# Patient Record
Sex: MALE | Race: WHITE | HISPANIC OR LATINO
[De-identification: names, ages, dates, MRNs, and addresses within clinical notes are randomized per-mention and may not be internally consistent; named-entity substitution may affect disease eponyms.]

---

## 2020-01-03 PROBLEM — Z00.00 ENCOUNTER FOR PREVENTIVE HEALTH EXAMINATION: Status: ACTIVE | Noted: 2020-01-03

## 2020-01-17 ENCOUNTER — APPOINTMENT (OUTPATIENT)
Dept: SURGERY | Facility: CLINIC | Age: 60
End: 2020-01-17

## 2020-02-11 ENCOUNTER — APPOINTMENT (OUTPATIENT)
Dept: SURGERY | Facility: CLINIC | Age: 60
End: 2020-02-11
Payer: COMMERCIAL

## 2020-02-11 VITALS
HEIGHT: 67 IN | BODY MASS INDEX: 28.25 KG/M2 | DIASTOLIC BLOOD PRESSURE: 87 MMHG | SYSTOLIC BLOOD PRESSURE: 149 MMHG | WEIGHT: 180 LBS | TEMPERATURE: 98.7 F | RESPIRATION RATE: 16 BRPM | OXYGEN SATURATION: 98 % | HEART RATE: 62 BPM

## 2020-02-11 DIAGNOSIS — Z78.9 OTHER SPECIFIED HEALTH STATUS: ICD-10-CM

## 2020-02-11 DIAGNOSIS — E78.00 PURE HYPERCHOLESTEROLEMIA, UNSPECIFIED: ICD-10-CM

## 2020-02-11 PROCEDURE — 46600 DIAGNOSTIC ANOSCOPY SPX: CPT

## 2020-02-11 PROCEDURE — 99213 OFFICE O/P EST LOW 20 MIN: CPT | Mod: 25

## 2020-02-11 RX ORDER — ATORVASTATIN CALCIUM 40 MG/1
40 TABLET, FILM COATED ORAL
Refills: 0 | Status: ACTIVE | COMMUNITY

## 2020-02-11 RX ORDER — CABERGOLINE 0.5 MG
0.5 TABLET ORAL
Refills: 0 | Status: ACTIVE | COMMUNITY

## 2020-02-11 NOTE — PHYSICAL EXAM
[Abdomen Tenderness] : ~T No ~M abdominal tenderness [Wheezing] : no wheezing was heard [Normal Rate and Rhythm] : normal rate and rhythm [No Rash or Lesion] : No rash or lesion [Alert] : alert [Calm] : calm [de-identified] : Perianal inspection reveals minimal external hemorrhoids. There is no fissure fistula or abscess. His rectal examination is normal. Anoscopy reveals friable and large circumferential internal hemorrhoids with evidence of recent bleeding. Distal rectal mucosa is otherwise normal. [de-identified] : nad [de-identified] : nl

## 2020-02-11 NOTE — ASSESSMENT
[FreeTextEntry1] : In summary the patient has persistent/intermittent rectal bleeding likely from his internal hemorrhoids. I recommended rubber band ligations. He chose to defer treatment for the time being and will followup if his bleeding worsens or persists. He will followup with GI for colorectal cancer screening

## 2020-02-11 NOTE — CONSULT LETTER
[Please see my note below.] : Please see my note below. [Consult Letter:] : I had the pleasure of evaluating your patient, [unfilled]. [Dear  ___] : Dear  [unfilled], [Sincerely,] : Sincerely, [Consult Closing:] : Thank you very much for allowing me to participate in the care of this patient.  If you have any questions, please do not hesitate to contact me. [DrGege  ___] : Dr. MEDINA [FreeTextEntry3] : Gurinder Santana M.D., F.A.C.S, F.A.S.C.R.S

## 2020-02-11 NOTE — HISTORY OF PRESENT ILLNESS
[FreeTextEntry1] : Adolfo is a 60 y/o male here for evaluation of rectal bleeding. Colonoscopy from 12/16/16 by Dr. Forrest Curtis demonstrated internal hemorrhoids. The patient complains of intermittent blood dripping into the bowl and on the tissue with bowel movements. He had rubber band ligation several years ago. He states that this only helped for short period of time and his blood recurred soon thereafter. He denies pain or prolapse of tissue.

## 2020-08-25 ENCOUNTER — APPOINTMENT (OUTPATIENT)
Dept: COLORECTAL SURGERY | Facility: CLINIC | Age: 60
End: 2020-08-25
Payer: COMMERCIAL

## 2020-08-25 VITALS
SYSTOLIC BLOOD PRESSURE: 174 MMHG | DIASTOLIC BLOOD PRESSURE: 91 MMHG | HEART RATE: 87 BPM | WEIGHT: 180 LBS | OXYGEN SATURATION: 99 % | TEMPERATURE: 99.4 F | RESPIRATION RATE: 16 BRPM | HEIGHT: 67 IN | BODY MASS INDEX: 28.25 KG/M2

## 2020-08-25 DIAGNOSIS — K64.4 RESIDUAL HEMORRHOIDAL SKIN TAGS: ICD-10-CM

## 2020-08-25 DIAGNOSIS — Z86.018 PERSONAL HISTORY OF OTHER BENIGN NEOPLASM: ICD-10-CM

## 2020-08-25 DIAGNOSIS — K62.5 HEMORRHAGE OF ANUS AND RECTUM: ICD-10-CM

## 2020-08-25 DIAGNOSIS — I10 ESSENTIAL (PRIMARY) HYPERTENSION: ICD-10-CM

## 2020-08-25 DIAGNOSIS — K64.8 OTHER HEMORRHOIDS: ICD-10-CM

## 2020-08-25 PROCEDURE — 46600 DIAGNOSTIC ANOSCOPY SPX: CPT

## 2020-08-25 PROCEDURE — 99214 OFFICE O/P EST MOD 30 MIN: CPT | Mod: 25

## 2020-08-25 RX ORDER — HYDROCORTISONE 25 MG/G
2.5 CREAM TOPICAL
Qty: 1 | Refills: 5 | Status: ACTIVE | COMMUNITY
Start: 2020-08-25 | End: 1900-01-01

## 2020-08-25 RX ORDER — AMLODIPINE BESYLATE 10 MG/1
10 TABLET ORAL
Refills: 0 | Status: ACTIVE | COMMUNITY

## 2020-08-25 NOTE — CONSULT LETTER
[Dear  ___] : Dear  [unfilled], [Consult Letter:] : I had the pleasure of evaluating your patient, [unfilled]. [Please see my note below.] : Please see my note below. [FreeTextEntry2] : Forrest Curtis [FreeTextEntry3] : Tony Duron MD FACS\par Chief Colon and Rectal Surgery\par [facility] [Sincerely,] : Sincerely,

## 2020-08-25 NOTE — PHYSICAL EXAM
[Normal Heart Sounds] : normal heart sounds [Normal Breath Sounds] : Normal breath sounds [Normal Rate and Rhythm] : normal rate and rhythm [Alert] : alert [Oriented to Person] : oriented to person [Oriented to Place] : oriented to place [Calm] : calm [Oriented to Time] : oriented to time [de-identified] : round, +BS [de-identified] : well appearing [de-identified] : NC/AT [de-identified] : intact [de-identified] : +ROM/YANNI

## 2020-08-25 NOTE — REVIEW OF SYSTEMS
[As Noted in HPI] : as noted in HPI [Negative] : Heme/Lymph [FreeTextEntry5] : HTN [de-identified] : benign pituitary mass

## 2020-08-25 NOTE — ASSESSMENT
[FreeTextEntry1] : Large internal and external hemorrhoids with inflammation\par -Internal hemorrhoids were gently reduced with continuous pressure circumferentially. Residual inflamed external hemorrhoids persisted\par -I recommended patient start fiber supplementation 2 times per day\par -Sitz baths 2 times per day and as needed. Epsom salts can be added as desired\par -Hydrocortisone cream 2 times per day and as needed\par -Follow up in one to 2 weeks for reevaluation if worsening symptoms will need to evaluate for possible thrombosis although none is present at this time\par -All questions were answered

## 2020-08-25 NOTE — HISTORY OF PRESENT ILLNESS
[FreeTextEntry1] : Patient is 58 yo male, rpesents for hemorrhoids. Painful external swelling began Sunday. Has hx of hemorrhoidal bleeding for many years, previously treated with banding. Daily BM, denies constipation or straining, But does report increased straining when pain occurred.. Last colonoscopy in 2016, negative.  Patient denies heart mass or polyp. No fevers or chills. No change in bowel habits besides over the weekend. He reports attempt at reducing the hemorrhoids but they would never go in. No aggravating factorsNo family history of colon cancer or inflammatory bowel disease

## 2020-08-26 ENCOUNTER — APPOINTMENT (OUTPATIENT)
Dept: SURGERY | Facility: CLINIC | Age: 60
End: 2020-08-26

## 2022-07-29 ENCOUNTER — APPOINTMENT (OUTPATIENT)
Dept: SURGERY | Facility: CLINIC | Age: 62
End: 2022-07-29